# Patient Record
Sex: MALE | Race: WHITE | Employment: FULL TIME | URBAN - METROPOLITAN AREA
[De-identification: names, ages, dates, MRNs, and addresses within clinical notes are randomized per-mention and may not be internally consistent; named-entity substitution may affect disease eponyms.]

---

## 2022-01-30 ENCOUNTER — HOSPITAL ENCOUNTER (EMERGENCY)
Age: 22
Discharge: HOME | End: 2022-01-30
Attending: EMERGENCY MEDICINE

## 2022-01-30 VITALS
HEART RATE: 95 BPM | BODY MASS INDEX: 42.66 KG/M2 | SYSTOLIC BLOOD PRESSURE: 175 MMHG | DIASTOLIC BLOOD PRESSURE: 89 MMHG | HEIGHT: 72 IN | RESPIRATION RATE: 16 BRPM | OXYGEN SATURATION: 100 % | WEIGHT: 315 LBS | TEMPERATURE: 97.3 F

## 2022-01-30 DIAGNOSIS — L05.01 PILONIDAL ABSCESS: Primary | ICD-10-CM

## 2022-01-30 PROCEDURE — 99283 EMERGENCY DEPT VISIT LOW MDM: CPT

## 2022-01-30 PROCEDURE — 6370000000 HC RX 637 (ALT 250 FOR IP): Performed by: EMERGENCY MEDICINE

## 2022-01-30 RX ORDER — ACETAMINOPHEN 500 MG
1000 TABLET ORAL ONCE
Status: COMPLETED | OUTPATIENT
Start: 2022-01-30 | End: 2022-01-30

## 2022-01-30 RX ORDER — DOXYCYCLINE 100 MG/1
100 CAPSULE ORAL ONCE
Status: COMPLETED | OUTPATIENT
Start: 2022-01-30 | End: 2022-01-30

## 2022-01-30 RX ORDER — DOXYCYCLINE 100 MG/1
100 TABLET ORAL 2 TIMES DAILY
Qty: 20 TABLET | Refills: 0 | Status: SHIPPED | OUTPATIENT
Start: 2022-01-30 | End: 2022-02-09

## 2022-01-30 RX ORDER — NAPROXEN 375 MG/1
375 TABLET ORAL 2 TIMES DAILY PRN
Qty: 20 TABLET | Refills: 0 | Status: SHIPPED | OUTPATIENT
Start: 2022-01-30 | End: 2022-02-25

## 2022-01-30 RX ORDER — NAPROXEN 500 MG/1
500 TABLET ORAL ONCE
Status: COMPLETED | OUTPATIENT
Start: 2022-01-30 | End: 2022-01-30

## 2022-01-30 RX ADMIN — NAPROXEN 500 MG: 500 TABLET ORAL at 17:03

## 2022-01-30 RX ADMIN — ACETAMINOPHEN 1000 MG: 500 TABLET ORAL at 17:03

## 2022-01-30 RX ADMIN — DOXYCYCLINE 100 MG: 100 CAPSULE ORAL at 17:03

## 2022-01-30 ASSESSMENT — PAIN SCALES - GENERAL: PAINLEVEL_OUTOF10: 3

## 2022-01-30 ASSESSMENT — ENCOUNTER SYMPTOMS
NAUSEA: 0
BACK PAIN: 0
VOMITING: 0

## 2022-01-30 NOTE — ED PROVIDER NOTES
[facility]  Emergency Department Encounter     Pt Name: Pedro Montes  MRN: [id number]  [place] 2000  Date of evaluation: 1/30/22  PCP:  No primary care provider on file. CHIEF COMPLAINT       Chief Complaint   Patient presents with    Abscess     coccyx area with drainage       HISTORY OF PRESENT ILLNESS  (Location/Symptom, Timing/Onset, Context/Setting, Quality, Duration, Modifying Factors, Severity.)    Pedro Montes is a 19 y.o. male who presents with abscess to tailbone area that has been going on for a couple of days. Drainage has been persistent and foul smelling. Has happened to him before in the past but spontaneously resolved on its own. Has never been diagnosed with pilonidal cyst before. No fevers chills sweats or body aches. No personal or family history of crohns disease or UC. No scrotal or penile involvement. PAST MEDICAL / SURGICAL / SOCIAL / FAMILY HISTORY    has a past medical history of Obesity. has no past surgical history on file. Social History     Socioeconomic History    Marital status: Single     Spouse name: Not on file    Number of children: Not on file    Years of education: Not on file    Highest education level: Not on file   Occupational History    Not on file   Tobacco Use    Smoking status: Never Smoker    Smokeless tobacco: Never Used   Substance and Sexual Activity    Alcohol use: Not on file    Drug use: No    Sexual activity: Not on file   Other Topics Concern    Not on file   Social History Narrative    Not on file     Social Determinants of Health     Financial Resource Strain:     Difficulty of Paying Living Expenses: Not on file   Food Insecurity:     Worried About Running Out of Food in the Last Year: Not on file    Kirk of Food in the Last Year: Not on file   Transportation Needs:     Lack of Transportation (Medical): Not on file    Lack of Transportation (Non-Medical):  Not on file Physical Activity:     Days of Exercise per Week: Not on file    Minutes of Exercise per Session: Not on file   Stress:     Feeling of Stress : Not on file   Social Connections:     Frequency of Communication with Friends and Family: Not on file    Frequency of Social Gatherings with Friends and Family: Not on file    Attends Druze Services: Not on file    Active Member of [address] [place] [address] or Organizations: Not on file    Attends Club or Organization Meetings: Not on file    Marital Status: Not on file   Intimate Partner Violence:     Fear of Current or Ex-Partner: Not on file    Emotionally Abused: Not on file    Physically Abused: Not on file    Sexually Abused: Not on file   Housing Stability:     Unable to Pay for Housing in the Last Year: Not on file    Number of [place] in the Last Year: Not on file    Unstable Housing in the Last Year: Not on file       Family History   Problem Relation Age of Onset     () Unknown        Allergies:    Patient has no known allergies. Home Medications:  Prior to Admission medications    Medication Sig Start Date End Date Taking? Authorizing Provider   doxycycline monohydrate (ADOXA) 100 MG tablet Take 1 tablet by mouth 2 times daily for 10 days 1/30/22 2/9/22 Yes Mary Angel DO   naproxen (NAPROSYN) 375 MG tablet Take 1 tablet by mouth 2 times daily as needed for Pain 1/30/22  Yes Mary Angel DO   clindamycin (CLINDAGEL) 1 % gel APPLY TOPICALLY 2 TIMES DAILY. 5/8/16   Mone Officer, MD   doxycycline (VIBRAMYCIN) 100 MG capsule Take 1 capsule by mouth 2 times daily 1/26/16 1/30/22  Mone Officer, MD   clotrimazole (LOTRIMIN) 1 % cream Apply topically 2 times daily. 1/19/16   MIRIAM Miguel - NP       REVIEW OF SYSTEMS    (2-9 systems for level 4, 10 or more for level 5)    Review of Systems   Constitutional: Negative for chills, diaphoresis and fever. Gastrointestinal: Negative for nausea and vomiting.    Endocrine: Negative for polyuria. Genitourinary: Negative for decreased urine volume, difficulty urinating, dysuria, enuresis, flank pain, frequency, genital sores, hematuria, penile discharge, penile pain, penile swelling, scrotal swelling, testicular pain and urgency. Musculoskeletal: Positive for myalgias. Negative for back pain. Skin: Positive for wound. Allergic/Immunologic: Negative for immunocompromised state. PHYSICAL EXAM   (up to 7 for level 4, 8 or more for level 5)    VITALS:   Vitals:    01/30/22 1629   BP: (!) 175/89   Pulse: 95   Resp: 16   Temp: 97.3 °F (36.3 °C)   SpO2: 100%   Weight: (!) 410 lb (186 kg)   Height: 6' (1.829 m)       Physical Exam  Vitals and nursing note reviewed. Exam conducted with a chaperone present. Constitutional:       General: He is not in acute distress. Appearance: He is well-developed. He is obese. He is not diaphoretic. HENT:      Head: Normocephalic and atraumatic. Eyes:      Conjunctiva/sclera: Conjunctivae normal.   Cardiovascular:      Rate and Rhythm: Normal rate and regular rhythm. Heart sounds: Normal heart sounds. Pulmonary:      Effort: Pulmonary effort is normal. No respiratory distress. Breath sounds: Normal breath sounds. Abdominal:      General: There is no distension. Palpations: Abdomen is soft. Tenderness: There is no abdominal tenderness. There is no guarding or rebound. Genitourinary:     Rectum: Normal.      Comments: Pilonidal cyst with two tracks, on superior and one mid-distal cleft with active foul smelling purulent drainage. No perianal or perirectal involvement. No involvement of the scrotum. No crepitus. Musculoskeletal:         General: Normal range of motion. Cervical back: Normal range of motion. Skin:     General: Skin is warm and dry. Findings: Erythema present. Neurological:      General: No focal deficit present. Mental Status: He is alert.    Psychiatric:         Behavior: Behavior normal. MD  [address] [facility] [address]  [phone]          DISCHARGE MEDICATIONS:  New Prescriptions    DOXYCYCLINE MONOHYDRATE (ADOXA) 100 MG TABLET    Take 1 tablet by mouth 2 times daily for 10 days    NAPROXEN (NAPROSYN) 375 MG TABLET    Take 1 tablet by mouth 2 times daily as needed for Pain       Mary Irwin, DO  Emergency Medicine Physician    (Please note that portions of this note were completed with a voice recognition program.  Efforts were made to edit the dictations but occasionally words are mis-transcribed.)        Sharif Bower 1721, DO  01/30/22 7460

## 2022-01-30 NOTE — ED NOTES
Pt c/o abscess to buttocks with drainage for several days. Pt states he has had abscesses in the past but they cleared up on their own.  Pt denies fever/chills, denies N&V     Mandi Hill, [facility]  01/30/22 6911

## 2022-02-09 ENCOUNTER — HOSPITAL ENCOUNTER (OUTPATIENT)
Dept: SURGERY | Age: 22
Discharge: HOME | End: 2022-02-09

## 2022-02-09 VITALS
WEIGHT: 315 LBS | HEART RATE: 90 BPM | HEIGHT: 72 IN | SYSTOLIC BLOOD PRESSURE: 137 MMHG | DIASTOLIC BLOOD PRESSURE: 94 MMHG | BODY MASS INDEX: 42.66 KG/M2

## 2022-02-09 PROCEDURE — 99202 OFFICE O/P NEW SF 15 MIN: CPT

## 2022-02-09 PROCEDURE — 99203 OFFICE O/P NEW LOW 30 MIN: CPT | Performed by: SURGERY

## 2022-02-09 NOTE — CONSULTS
[facility]  Consult Note            NAME:  Rivera Kay  MRN: [id number]   YOB: 2000   Date: 2022   Age: 19 y.o. Gender: male     Body mass index is 55.33 kg/m². Chief Complaint: Recurrent  cleft infections    History of Present Illness: This is a 57-year-old male with morbid obesity who has had history of  cleft infections. Patient states that initially he had this episode in 2017. After it spontaneously drained, he felt that the symptoms had resolved. However he has been having recurrent episodes of drainage from the area starting this year. Patient feels that the infection is occurring in the same location. He has had no history of trauma to the area. He denies any fevers or chills. He comes to us for definitive treatment. Current Outpatient Medications on File Prior to Encounter   Medication Sig Dispense Refill    doxycycline monohydrate (ADOXA) 100 MG tablet Take 1 tablet by mouth 2 times daily for 10 days 20 tablet 0    naproxen (NAPROSYN) 375 MG tablet Take 1 tablet by mouth 2 times daily as needed for Pain 20 tablet 0    clindamycin (CLINDAGEL) 1 % gel APPLY TOPICALLY 2 TIMES DAILY. 1 Tube 5    [DISCONTINUED] doxycycline (VIBRAMYCIN) 100 MG capsule Take 1 capsule by mouth 2 times daily 60 capsule 11    clotrimazole (LOTRIMIN) 1 % cream Apply topically 2 times daily. 1 Tube 0     No current facility-administered medications on file prior to encounter. No Known Allergies    Past Medical History:   Diagnosis Date    Obesity        History reviewed. No pertinent surgical history.     Family History   Problem Relation Age of Onset     () Unknown         Social History     Socioeconomic History    Marital status: Single     Spouse name: None    Number of children: None    Years of education: None    Highest education level: None   Occupational History    None   Tobacco Use    Smoking status: Never Smoker    Smokeless tobacco: Never Used   Substance and Sexual Activity    Alcohol use: None    Drug use: No    Sexual activity: None   Other Topics Concern    None   Social History Narrative    None     Social Determinants of Health     Financial Resource Strain:     Difficulty of Paying Living Expenses: Not on file   Food Insecurity:     Worried About Running Out of Food in the Last Year: Not on file    Kirk of Food in the Last Year: Not on file   Transportation Needs:     Lack of Transportation (Medical): Not on file    Lack of Transportation (Non-Medical): Not on file   Physical Activity:     Days of Exercise per Week: Not on file    Minutes of Exercise per Session: Not on file   Stress:     Feeling of Stress : Not on file   Social Connections:     Frequency of Communication with Friends and Family: Not on file    Frequency of Social Gatherings with Friends and Family: Not on file    Attends Catholic Services: Not on file    Active Member of [address] [place] [address] or Organizations: Not on file    Attends Club or Organization Meetings: Not on file    Marital Status: Not on file   Intimate Partner Violence:     Fear of Current or Ex-Partner: Not on file    Emotionally Abused: Not on file    Physically Abused: Not on file    Sexually Abused: Not on file   Housing Stability:     Unable to Pay for Housing in the Last Year: Not on file    Number of [place] in the Last Year: Not on file    Unstable Housing in the Last Year: Not on file       Review of Systems - Negative except depression    Vitals:    02/09/22 1557   BP: (!) 137/94   Pulse: 90        No intake/output data recorded.     General Appearance: alert and oriented to person, place and time, well developed and well- nourished, in no acute distress patient is morbidly obese  Skin: warm and dry, no rash or erythema  Head: normocephalic and atraumatic  Eyes: pupils equal, round, and reactive to light, extraocular eye movements intact, conjunctivae normal  ENT: tympanic membrane, external ear and ear canal normal bilaterally, nose without deformity, nasal mucosa and turbinates normal without polyps  Neck: supple and non-tender without mass, no thyromegaly or thyroid nodules, no cervical lymphadenopathy  Pulmonary/Chest: clear to auscultation bilaterally- no wheezes, rales or rhonchi, normal air movement, no respiratory distress  Cardiovascular: normal rate, regular rhythm, normal S1 and S2, no murmurs, rubs, clicks, or gallops, distal pulses intact, no carotid bruits  Abdomen: soft, non-tender, non-distended, normal bowel sounds, no masses or organomegaly. Examination in the prone jackknife position shows an area of induration in the flor cleft region with small dimpling in the midline suggesting possibly pilonidal cyst related infection. There is no underlying abscess noted. Extremities: no cyanosis, clubbing or edema  Musculoskeletal: normal range of motion, no joint swelling, deformity or tenderness  Neurologic: reflexes normal and symmetric, no cranial nerve deficit, gait, coordination and speech normal    Hemoglobin   Date Value Ref Range Status   2016 14.7 13.5 - 17.5 g/dL Final     Hematocrit   Date Value Ref Range Status   2016 44.0 41 - 53 % Final     WBC   Date Value Ref Range Status   2016 8.7 4.5 - 13.5 k/uL Final     Sodium   Date Value Ref Range Status   2016 139 135 - 144 mmol/L Final     Potassium   Date Value Ref Range Status   2016 4.0 3.6 - 4.9 mmol/L Final     Chloride   Date Value Ref Range Status   2016 100 98 - 107 mmol/L Final     CO2   Date Value Ref Range Status   2016 22 20 - 31 mmol/L Final     BUN   Date Value Ref Range Status   2016 15 5 - 18 mg/dL Final     Glucose   Date Value Ref Range Status   2016 89 60 - 100 mg/dL Final        No results found. Assessment: Pilonidal cyst    Plan: Patient with recurrent episodes of  cleft infections.   The findings are most likely suggestive of pilonidal cyst disease. I recommend patient to consider excision with cystectomy. Procedure and the risks were discussed. Risks for infection, bleeding, recurrence, slow healing of the wound etc. were discussed. Strong recommendation was made for the patient to make effort to lose weight. We will schedule for the surgery in the near future.     The total time spent on review of patient's record, physical examination, treatment plan and review of systems included: 30    Electronically signed by Daniel Brice MD on 2/9/2022 at 5:11 PM

## 2022-02-25 ENCOUNTER — HOSPITAL ENCOUNTER (OUTPATIENT)
Dept: PREADMISSION TESTING | Age: 22
Discharge: HOME | End: 2022-03-01

## 2022-02-25 VITALS
RESPIRATION RATE: 20 BRPM | HEIGHT: 72 IN | TEMPERATURE: 98.6 F | OXYGEN SATURATION: 100 % | DIASTOLIC BLOOD PRESSURE: 87 MMHG | BODY MASS INDEX: 42.66 KG/M2 | WEIGHT: 315 LBS | SYSTOLIC BLOOD PRESSURE: 143 MMHG | HEART RATE: 69 BPM

## 2022-02-25 LAB
ANION GAP SERPL CALCULATED.3IONS-SCNC: 10 MMOL/L (ref 9–17)
BUN BLDV-MCNC: 12 MG/DL (ref 6–20)
BUN/CREAT BLD: 15 (ref 9–20)
CALCIUM SERPL-MCNC: 9.4 MG/DL (ref 8.6–10.4)
CHLORIDE BLD-SCNC: 102 MMOL/L (ref 98–107)
CO2: 26 MMOL/L (ref 20–31)
CREAT SERPL-MCNC: 0.78 MG/DL (ref 0.7–1.2)
GFR AFRICAN AMERICAN: >60 ML/MIN
GFR NON-AFRICAN AMERICAN: >60 ML/MIN
GFR SERPL CREATININE-BSD FRML MDRD: NORMAL ML/MIN/{1.73_M2}
GLUCOSE BLD-MCNC: 94 MG/DL (ref 70–99)
HCT VFR BLD CALC: 42.1 % (ref 40.7–50.3)
HEMOGLOBIN: 13.5 G/DL (ref 13–17)
MCH RBC QN AUTO: 27 PG (ref 25.2–33.5)
MCHC RBC AUTO-ENTMCNC: 32.1 G/DL (ref 28.4–34.8)
MCV RBC AUTO: 84.2 FL (ref 82.6–102.9)
NRBC AUTOMATED: 0 PER 100 WBC
PDW BLD-RTO: 14.9 % (ref 11.8–14.4)
PLATELET # BLD: 302 K/UL (ref 138–453)
PMV BLD AUTO: 9.3 FL (ref 8.1–13.5)
POTASSIUM SERPL-SCNC: 4.9 MMOL/L (ref 3.7–5.3)
RBC # BLD: 5 M/UL (ref 4.21–5.77)
SODIUM BLD-SCNC: 138 MMOL/L (ref 135–144)
WBC # BLD: 8.7 K/UL (ref 3.5–11.3)

## 2022-02-25 PROCEDURE — 80048 BASIC METABOLIC PNL TOTAL CA: CPT

## 2022-02-25 PROCEDURE — 36415 COLL VENOUS BLD VENIPUNCTURE: CPT

## 2022-02-25 PROCEDURE — 93005 ELECTROCARDIOGRAM TRACING: CPT | Performed by: ANESTHESIOLOGY

## 2022-02-25 PROCEDURE — 85027 COMPLETE CBC AUTOMATED: CPT

## 2022-02-25 RX ORDER — ACETAMINOPHEN 325 MG/1
1000 TABLET ORAL EVERY 6 HOURS PRN
COMMUNITY

## 2022-02-25 NOTE — PRE-PROCEDURE INSTRUCTIONS
ARRIVE  [facility] Thursday March 10th  Arrive at 12:00  Any questions call [phone]    Once you enter the hospital lobby, take the elevators to the second floor. Check-In is at the surgery registration desk. Continue to take your home medications as you normally do up to and including the night before surgery with the exception of any blood thinning medications. Please stop any blood thinning medications as directed by your surgeon or prescribing physician. Failure to stop certain medications may interfere with your scheduled surgery. These may include:  Aspirin, Warfarin (Coumadin), Clopidogrel (Plavix), Ibuprofen (Motrin, Advil), Naproxen (Aleve), Meloxicam (Mobic), Celecoxib (Celebrex), Eliquis, Pradaxa, Xarelto, Effient, Fish Oil, Herbal supplements. none    Please take the following medication(s) the day of surgery with a small sip of water:  none    PREPARING FOR YOUR SURGERY:     Before surgery, you can play an important role in your own health. Because skin is not sterile, we need to be sure that your skin is as free of germs as possible before surgery by carefully washing before surgery. Preparing or prepping skin before surgery can reduce the risk of a surgical site infection.   Do not shave the area of your body where your surgery will be performed unless you received specific permission from your physician. Preoperative Bathing Instructions   Bathe or shower the day of surgery.  Wear clean clothing after bathing.  Shampoo hair before surgery   Keep nails clean    Do not apply alcohol-based hair or skin products,    Do not apply lotion, emollients, cosmetics, perfumes or deodorant the day of surgery.  Do not shave the area of your body where your surgery will be performed unless you receive specific permission from your surgeon.        Patient Instructions:    [place] If you are having any type of anesthesia you are to have nothing to eat or drink after midnight the night before your surgery. This includes gum, mints, water or smoking or chewing tobacco.  The only exception to this is a small sip of water to take with any morning dose of heart, blood pressure, or seizure medications. No alcoholic beverages for 24 hours prior to surgery.  Brush your teeth but do not swallow water.  Do not wear any jewelry or body piercings day of surgery. Also, NO lotion, perfume or deodorant to be used the day of surgery. No nail polish on the operative extremity (arm/leg surgeries)     Do not bring any valuables, such as jewelry, cash or credit cards. If you are staying overnight with us, please bring a SMALL bag of personal items.  Please wear loose, comfortable clothing. If you are potentially going to have a cast or brace bring clothing that will fit over them.  In case of illness - If you have cold or flu like symptoms (high fever, runny nose, sore throat, cough, etc.) rash, nausea, vomiting, loose stools, and/or recent contact with someone who has a contagious disease (chicken pox, measles, etc.) Please call your doctor before coming to the hospital.     Day of Surgery/Procedure:    As a patient at [place] [address] you can expect quality medical and nursing care that is centered on your individual needs. Our goal is to make your surgical experience as comfortable as possible    . Transportation After Your Surgery/Procedure: You will need a friend or family member to drive you home after your procedure. Your  must be 25years of age or older and able to sign off on your discharge instructions. A taxi cab or any other form of public transportation is not acceptable. Your friend or family member must stay at the hospital throughout your procedure.     Someone must remain with you for the first 24 hours after your surgery if you receive anesthesia or medication. If you do not have someone to stay with you, your procedure may be cancelled.       If you have any other questions regarding your procedure or the day of surgery, please call [phone]      _________________________  ____________________________  Signature (Patient)              Signature (Provider) & date

## 2022-02-26 LAB
EKG ATRIAL RATE: 68 BPM
EKG P AXIS: 29 DEGREES
EKG P-R INTERVAL: 160 MS
EKG Q-T INTERVAL: 380 MS
EKG QRS DURATION: 94 MS
EKG QTC CALCULATION (BAZETT): 404 MS
EKG R AXIS: 32 DEGREES
EKG T AXIS: 28 DEGREES
EKG VENTRICULAR RATE: 68 BPM

## 2022-02-26 PROCEDURE — 93010 ELECTROCARDIOGRAM REPORT: CPT | Performed by: INTERNAL MEDICINE

## 2022-03-09 ENCOUNTER — ANESTHESIA EVENT (OUTPATIENT)
Dept: OPERATING ROOM | Age: 22
End: 2022-03-09

## 2022-03-10 ENCOUNTER — HOSPITAL ENCOUNTER (OUTPATIENT)
Age: 22
Setting detail: OUTPATIENT SURGERY
Discharge: HOME | End: 2022-03-10
Attending: SURGERY | Admitting: SURGERY

## 2022-03-10 ENCOUNTER — ANESTHESIA (OUTPATIENT)
Dept: OPERATING ROOM | Age: 22
End: 2022-03-10

## 2022-03-10 VITALS
RESPIRATION RATE: 13 BRPM | TEMPERATURE: 97.5 F | OXYGEN SATURATION: 95 % | HEIGHT: 72 IN | SYSTOLIC BLOOD PRESSURE: 151 MMHG | DIASTOLIC BLOOD PRESSURE: 70 MMHG | HEART RATE: 80 BPM | BODY MASS INDEX: 42.66 KG/M2 | WEIGHT: 315 LBS

## 2022-03-10 VITALS — DIASTOLIC BLOOD PRESSURE: 81 MMHG | SYSTOLIC BLOOD PRESSURE: 187 MMHG | TEMPERATURE: 97 F | OXYGEN SATURATION: 98 %

## 2022-03-10 DIAGNOSIS — G89.18 ACUTE POST-OPERATIVE PAIN: Primary | ICD-10-CM

## 2022-03-10 PROCEDURE — 3600000002 HC SURGERY LEVEL 2 BASE: Performed by: SURGERY

## 2022-03-10 PROCEDURE — 2709999900 HC NON-CHARGEABLE SUPPLY: Performed by: SURGERY

## 2022-03-10 PROCEDURE — 2500000003 HC RX 250 WO HCPCS: Performed by: SURGERY

## 2022-03-10 PROCEDURE — 2580000003 HC RX 258: Performed by: SURGERY

## 2022-03-10 PROCEDURE — 7100000011 HC PHASE II RECOVERY - ADDTL 15 MIN: Performed by: SURGERY

## 2022-03-10 PROCEDURE — 7100000010 HC PHASE II RECOVERY - FIRST 15 MIN: Performed by: SURGERY

## 2022-03-10 PROCEDURE — 6360000002 HC RX W HCPCS: Performed by: SURGERY

## 2022-03-10 PROCEDURE — 3600000012 HC SURGERY LEVEL 2 ADDTL 15MIN: Performed by: SURGERY

## 2022-03-10 PROCEDURE — 7100000001 HC PACU RECOVERY - ADDTL 15 MIN: Performed by: SURGERY

## 2022-03-10 PROCEDURE — 88304 TISSUE EXAM BY PATHOLOGIST: CPT

## 2022-03-10 PROCEDURE — 3700000001 HC ADD 15 MINUTES (ANESTHESIA): Performed by: SURGERY

## 2022-03-10 PROCEDURE — 6360000002 HC RX W HCPCS: Performed by: NURSE ANESTHETIST, CERTIFIED REGISTERED

## 2022-03-10 PROCEDURE — 7100000000 HC PACU RECOVERY - FIRST 15 MIN: Performed by: SURGERY

## 2022-03-10 PROCEDURE — 11772 EXC PILONIDAL CYST COMP: CPT | Performed by: SURGERY

## 2022-03-10 PROCEDURE — 2500000003 HC RX 250 WO HCPCS: Performed by: NURSE ANESTHETIST, CERTIFIED REGISTERED

## 2022-03-10 PROCEDURE — 3700000000 HC ANESTHESIA ATTENDED CARE: Performed by: SURGERY

## 2022-03-10 PROCEDURE — 6370000000 HC RX 637 (ALT 250 FOR IP)

## 2022-03-10 PROCEDURE — 2580000003 HC RX 258: Performed by: ANESTHESIOLOGY

## 2022-03-10 RX ORDER — ONDANSETRON 2 MG/ML
4 INJECTION INTRAMUSCULAR; INTRAVENOUS
Status: DISCONTINUED | OUTPATIENT
Start: 2022-03-10 | End: 2022-03-10 | Stop reason: HOSPADM

## 2022-03-10 RX ORDER — KETOROLAC TROMETHAMINE 30 MG/ML
30 INJECTION, SOLUTION INTRAMUSCULAR; INTRAVENOUS ONCE
Status: DISCONTINUED | OUTPATIENT
Start: 2022-03-10 | End: 2022-03-10

## 2022-03-10 RX ORDER — OXYCODONE HYDROCHLORIDE 5 MG/1
5 TABLET ORAL EVERY 6 HOURS PRN
Qty: 20 TABLET | Refills: 0 | Status: SHIPPED | OUTPATIENT
Start: 2022-03-10 | End: 2022-03-15

## 2022-03-10 RX ORDER — SODIUM CHLORIDE 9 MG/ML
25 INJECTION, SOLUTION INTRAVENOUS PRN
Status: DISCONTINUED | OUTPATIENT
Start: 2022-03-10 | End: 2022-03-10 | Stop reason: HOSPADM

## 2022-03-10 RX ORDER — ACETAMINOPHEN 325 MG/1
TABLET ORAL
Status: DISCONTINUED
Start: 2022-03-10 | End: 2022-03-10 | Stop reason: HOSPADM

## 2022-03-10 RX ORDER — LIDOCAINE HYDROCHLORIDE 10 MG/ML
1 INJECTION, SOLUTION EPIDURAL; INFILTRATION; INTRACAUDAL; PERINEURAL
Status: DISCONTINUED | OUTPATIENT
Start: 2022-03-11 | End: 2022-03-10 | Stop reason: HOSPADM

## 2022-03-10 RX ORDER — ACETAMINOPHEN 325 MG/1
650 TABLET ORAL ONCE
Status: COMPLETED | OUTPATIENT
Start: 2022-03-10 | End: 2022-03-10

## 2022-03-10 RX ORDER — FENTANYL CITRATE 50 UG/ML
25 INJECTION, SOLUTION INTRAMUSCULAR; INTRAVENOUS EVERY 5 MIN PRN
Status: DISCONTINUED | OUTPATIENT
Start: 2022-03-10 | End: 2022-03-10 | Stop reason: HOSPADM

## 2022-03-10 RX ORDER — SODIUM CHLORIDE, SODIUM LACTATE, POTASSIUM CHLORIDE, CALCIUM CHLORIDE 600; 310; 30; 20 MG/100ML; MG/100ML; MG/100ML; MG/100ML
INJECTION, SOLUTION INTRAVENOUS CONTINUOUS
Status: DISCONTINUED | OUTPATIENT
Start: 2022-03-11 | End: 2022-03-10 | Stop reason: HOSPADM

## 2022-03-10 RX ORDER — IBUPROFEN 200 MG
400 TABLET ORAL EVERY 6 HOURS
Qty: 120 TABLET | Refills: 3 | Status: SHIPPED | OUTPATIENT
Start: 2022-03-10

## 2022-03-10 RX ORDER — ROCURONIUM BROMIDE 10 MG/ML
INJECTION, SOLUTION INTRAVENOUS PRN
Status: DISCONTINUED | OUTPATIENT
Start: 2022-03-10 | End: 2022-03-10 | Stop reason: SDUPTHER

## 2022-03-10 RX ORDER — FENTANYL CITRATE 50 UG/ML
INJECTION, SOLUTION INTRAMUSCULAR; INTRAVENOUS PRN
Status: DISCONTINUED | OUTPATIENT
Start: 2022-03-10 | End: 2022-03-10 | Stop reason: SDUPTHER

## 2022-03-10 RX ORDER — SENNA AND DOCUSATE SODIUM 50; 8.6 MG/1; MG/1
1 TABLET, FILM COATED ORAL DAILY
Qty: 60 TABLET | Refills: 0 | Status: SHIPPED | OUTPATIENT
Start: 2022-03-10

## 2022-03-10 RX ORDER — BUPIVACAINE HYDROCHLORIDE AND EPINEPHRINE 5; 5 MG/ML; UG/ML
INJECTION, SOLUTION EPIDURAL; INTRACAUDAL; PERINEURAL PRN
Status: DISCONTINUED | OUTPATIENT
Start: 2022-03-10 | End: 2022-03-10 | Stop reason: ALTCHOICE

## 2022-03-10 RX ORDER — PROPOFOL 10 MG/ML
INJECTION, EMULSION INTRAVENOUS PRN
Status: DISCONTINUED | OUTPATIENT
Start: 2022-03-10 | End: 2022-03-10 | Stop reason: SDUPTHER

## 2022-03-10 RX ORDER — MIDAZOLAM HYDROCHLORIDE 1 MG/ML
INJECTION INTRAMUSCULAR; INTRAVENOUS PRN
Status: DISCONTINUED | OUTPATIENT
Start: 2022-03-10 | End: 2022-03-10 | Stop reason: SDUPTHER

## 2022-03-10 RX ORDER — SODIUM CHLORIDE 0.9 % (FLUSH) 0.9 %
5-40 SYRINGE (ML) INJECTION EVERY 12 HOURS SCHEDULED
Status: DISCONTINUED | OUTPATIENT
Start: 2022-03-10 | End: 2022-03-10 | Stop reason: HOSPADM

## 2022-03-10 RX ORDER — HYDROMORPHONE HYDROCHLORIDE 1 MG/ML
0.25 INJECTION, SOLUTION INTRAMUSCULAR; INTRAVENOUS; SUBCUTANEOUS EVERY 5 MIN PRN
Status: DISCONTINUED | OUTPATIENT
Start: 2022-03-10 | End: 2022-03-10 | Stop reason: HOSPADM

## 2022-03-10 RX ORDER — LIDOCAINE HYDROCHLORIDE 20 MG/ML
INJECTION, SOLUTION EPIDURAL; INFILTRATION; INTRACAUDAL; PERINEURAL PRN
Status: DISCONTINUED | OUTPATIENT
Start: 2022-03-10 | End: 2022-03-10 | Stop reason: SDUPTHER

## 2022-03-10 RX ORDER — SODIUM CHLORIDE 0.9 % (FLUSH) 0.9 %
5-40 SYRINGE (ML) INJECTION PRN
Status: DISCONTINUED | OUTPATIENT
Start: 2022-03-10 | End: 2022-03-10 | Stop reason: HOSPADM

## 2022-03-10 RX ORDER — DEXAMETHASONE SODIUM PHOSPHATE 10 MG/ML
INJECTION INTRAMUSCULAR; INTRAVENOUS PRN
Status: DISCONTINUED | OUTPATIENT
Start: 2022-03-10 | End: 2022-03-10 | Stop reason: SDUPTHER

## 2022-03-10 RX ORDER — ACETAMINOPHEN 325 MG/1
TABLET ORAL
Status: COMPLETED
Start: 2022-03-10 | End: 2022-03-10

## 2022-03-10 RX ORDER — SODIUM CHLORIDE 0.9 % (FLUSH) 0.9 %
10 SYRINGE (ML) INJECTION PRN
Status: DISCONTINUED | OUTPATIENT
Start: 2022-03-10 | End: 2022-03-10 | Stop reason: HOSPADM

## 2022-03-10 RX ORDER — ONDANSETRON 2 MG/ML
INJECTION INTRAMUSCULAR; INTRAVENOUS PRN
Status: DISCONTINUED | OUTPATIENT
Start: 2022-03-10 | End: 2022-03-10 | Stop reason: SDUPTHER

## 2022-03-10 RX ORDER — SODIUM CHLORIDE 9 MG/ML
INJECTION, SOLUTION INTRAVENOUS CONTINUOUS
Status: DISCONTINUED | OUTPATIENT
Start: 2022-03-11 | End: 2022-03-10 | Stop reason: HOSPADM

## 2022-03-10 RX ORDER — SODIUM CHLORIDE 0.9 % (FLUSH) 0.9 %
10 SYRINGE (ML) INJECTION EVERY 12 HOURS SCHEDULED
Status: DISCONTINUED | OUTPATIENT
Start: 2022-03-10 | End: 2022-03-10 | Stop reason: HOSPADM

## 2022-03-10 RX ORDER — ONDANSETRON 4 MG/1
4 TABLET, FILM COATED ORAL EVERY 8 HOURS PRN
Qty: 20 TABLET | Refills: 0 | Status: SHIPPED | OUTPATIENT
Start: 2022-03-10

## 2022-03-10 RX ORDER — KETOROLAC TROMETHAMINE 30 MG/ML
INJECTION, SOLUTION INTRAMUSCULAR; INTRAVENOUS PRN
Status: DISCONTINUED | OUTPATIENT
Start: 2022-03-10 | End: 2022-03-10 | Stop reason: SDUPTHER

## 2022-03-10 RX ADMIN — SUGAMMADEX 500 MG: 100 INJECTION, SOLUTION INTRAVENOUS at 12:44

## 2022-03-10 RX ADMIN — KETOROLAC TROMETHAMINE 30 MG: 30 INJECTION, SOLUTION INTRAMUSCULAR; INTRAVENOUS at 12:01

## 2022-03-10 RX ADMIN — ACETAMINOPHEN 650 MG: 325 TABLET ORAL at 13:58

## 2022-03-10 RX ADMIN — PROPOFOL 50 MG: 10 INJECTION, EMULSION INTRAVENOUS at 12:42

## 2022-03-10 RX ADMIN — CEFAZOLIN SODIUM 3000 MG: 10 INJECTION, POWDER, FOR SOLUTION INTRAVENOUS at 11:28

## 2022-03-10 RX ADMIN — LIDOCAINE HYDROCHLORIDE 100 MG: 20 INJECTION, SOLUTION EPIDURAL; INFILTRATION; INTRACAUDAL; PERINEURAL at 11:20

## 2022-03-10 RX ADMIN — Medication 100 MCG: at 11:20

## 2022-03-10 RX ADMIN — PROPOFOL 350 MG: 10 INJECTION, EMULSION INTRAVENOUS at 11:20

## 2022-03-10 RX ADMIN — SODIUM CHLORIDE, POTASSIUM CHLORIDE, SODIUM LACTATE AND CALCIUM CHLORIDE: 600; 310; 30; 20 INJECTION, SOLUTION INTRAVENOUS at 11:39

## 2022-03-10 RX ADMIN — ONDANSETRON 4 MG: 2 INJECTION INTRAMUSCULAR; INTRAVENOUS at 11:39

## 2022-03-10 RX ADMIN — LIDOCAINE HYDROCHLORIDE 100 MG: 20 INJECTION, SOLUTION EPIDURAL; INFILTRATION; INTRACAUDAL; PERINEURAL at 12:49

## 2022-03-10 RX ADMIN — ROCURONIUM BROMIDE 50 MG: 10 INJECTION, SOLUTION INTRAVENOUS at 11:20

## 2022-03-10 RX ADMIN — MIDAZOLAM 2 MG: 1 INJECTION INTRAMUSCULAR; INTRAVENOUS at 11:16

## 2022-03-10 RX ADMIN — DEXAMETHASONE SODIUM PHOSPHATE 10 MG: 10 INJECTION INTRAMUSCULAR; INTRAVENOUS at 11:39

## 2022-03-10 ASSESSMENT — PULMONARY FUNCTION TESTS
PIF_VALUE: 29
PIF_VALUE: 30
PIF_VALUE: 30
PIF_VALUE: 22
PIF_VALUE: 28
PIF_VALUE: 30
PIF_VALUE: 29
PIF_VALUE: 30
PIF_VALUE: 29
PIF_VALUE: 29
PIF_VALUE: 30
PIF_VALUE: 29
PIF_VALUE: 30
PIF_VALUE: 29
PIF_VALUE: 2
PIF_VALUE: 21
PIF_VALUE: 29
PIF_VALUE: 28
PIF_VALUE: 30
PIF_VALUE: 1
PIF_VALUE: 28
PIF_VALUE: 14
PIF_VALUE: 30
PIF_VALUE: 28
PIF_VALUE: 29
PIF_VALUE: 28
PIF_VALUE: 30
PIF_VALUE: 30
PIF_VALUE: 29
PIF_VALUE: 28
PIF_VALUE: 28
PIF_VALUE: 30
PIF_VALUE: 4
PIF_VALUE: 2
PIF_VALUE: 29
PIF_VALUE: 30
PIF_VALUE: 29
PIF_VALUE: 29
PIF_VALUE: 28
PIF_VALUE: 1
PIF_VALUE: 17
PIF_VALUE: 21
PIF_VALUE: 1
PIF_VALUE: 30
PIF_VALUE: 24
PIF_VALUE: 30
PIF_VALUE: 28
PIF_VALUE: 17
PIF_VALUE: 30
PIF_VALUE: 29
PIF_VALUE: 37
PIF_VALUE: 36
PIF_VALUE: 29
PIF_VALUE: 29
PIF_VALUE: 30
PIF_VALUE: 28
PIF_VALUE: 29
PIF_VALUE: 4
PIF_VALUE: 6
PIF_VALUE: 28
PIF_VALUE: 30
PIF_VALUE: 33
PIF_VALUE: 28
PIF_VALUE: 30
PIF_VALUE: 30
PIF_VALUE: 28
PIF_VALUE: 30
PIF_VALUE: 28
PIF_VALUE: 29
PIF_VALUE: 1
PIF_VALUE: 29
PIF_VALUE: 33
PIF_VALUE: 29
PIF_VALUE: 6
PIF_VALUE: 28
PIF_VALUE: 29
PIF_VALUE: 8
PIF_VALUE: 1
PIF_VALUE: 35
PIF_VALUE: 30
PIF_VALUE: 20
PIF_VALUE: 28
PIF_VALUE: 24
PIF_VALUE: 24
PIF_VALUE: 29
PIF_VALUE: 30
PIF_VALUE: 28
PIF_VALUE: 1
PIF_VALUE: 29
PIF_VALUE: 29
PIF_VALUE: 30
PIF_VALUE: 29
PIF_VALUE: 30
PIF_VALUE: 30
PIF_VALUE: 29
PIF_VALUE: 21
PIF_VALUE: 30
PIF_VALUE: 29

## 2022-03-10 ASSESSMENT — PAIN - FUNCTIONAL ASSESSMENT: PAIN_FUNCTIONAL_ASSESSMENT: 0-10

## 2022-03-10 ASSESSMENT — LIFESTYLE VARIABLES: SMOKING_STATUS: 0

## 2022-03-10 ASSESSMENT — PAIN SCALES - GENERAL: PAINLEVEL_OUTOF10: 3

## 2022-03-10 NOTE — H&P
History and Physical Update    Pt Name: Annia Cohen  MRN: [id number]  YOB: 2000  Date of evaluation: 3/10/2022      [x] I have reviewed the general surgery consult  note found in Epic by Dr. Fernanda Carreon dated 22 used for an Interval History and Physical note. [x] I have examined Annia Cohen a 19 y.o., male who is scheduled for a pilonidal cystectomy by Dr. Fernanda Carreon due to pilonidal cyst. The patient denies health changes since his appointment with Dr. Fernanda Carreon on 22. Pt denies fever, chills, productive cough, SOB, chest pain, rashes, and history of diabetes. Pt denies taking blood thinning medications in the past 10 days. EK22   Narrative & Impression    Normal sinus rhythm  Inferior infarct , age undetermined  Abnormal ECG  No previous ECGs available     Dr. Cynthia Anderson looked at the pt's EKG dated 22 and assessed the pt in Pre-op. Vital signs: /71   Pulse 91   Temp 97.5 °F (36.4 °C) (Tympanic)   Resp 18   Ht 6' (1.829 m)   Wt (!) 412 lb (186.9 kg)   SpO2 97%   BMI 55.88 kg/m²      Allergies:  Patient has no known allergies. Past Medical History:     Past Medical History:   Diagnosis Date    Obesity         Past Surgical History:     Past Surgical History:   Procedure Laterality Date    EYE SURGERY      eyebrow closure        Social History:     Tobacco:    reports that he quit smoking about 4 weeks ago. He has never used smokeless tobacco.  Alcohol:      reports current alcohol use. Drug Use:  reports previous drug use. Family History:     History reviewed. No pertinent family history. Medications:    Prior to Admission medications    Medication Sig Start Date End Date Taking?  Authorizing Provider   acetaminophen (TYLENOL) 325 MG tablet Take 1,000 mg by mouth every 6 hours as needed for Pain    Historical Provider, MD   doxycycline (VIBRAMYCIN) 100 MG capsule Take 1 capsule by mouth 2 times daily 16  Nabil Garcia MD       Review of Systems: Positive and Negative as described in HPI. CONSTITUTIONAL: Negative for fevers, chills, sweats, fatigue, and weight loss. HEENT: Negative for glasses, hearing changes, rhinorrhea, and throat pain. RESPIRATORY: Negative for shortness of breath, cough, congestion, and wheezing. CARDIOVASCULAR: Negative for chest pain, blood clot, irregular heartbeat, and palpitations. GASTROINTESTINAL: Negative for reflux, nausea, vomiting, diarrhea, constipation, change in bowel habits, and abdominal pain. GENITOURINARY: Negative for difficulty of urination, burning with urination, and frequency. INTEGUMENT: Zohaib cleft infections per the pt's medical record. Negative for rash and easy bruising. HEMATOLOGIC/LYMPHATIC: Negative for swelling/edema. ALLERGIC/IMMUNOLOGIC: Negative for urticaria and itching. ENDOCRINE: Negative for increase in drinking, increase in urination, and heat or cold intolerance. MUSCULOSKELETAL: Negative joint pains, muscle aches, and swelling of joints. NEUROLOGICAL: Occasional headaches. Negative for dizziness, lightheadedness, numbness, and tingling extremities. Pt denies history of seizures and strokes. BEHAVIOR/PSYCH: Negative for depression and anxiety. Physical Exam:     This is a 19 y.o. male who is pleasant, cooperative, alert and oriented x 3, in no acute distress. Morbidly obese. Heart: Regular rate and rhythm without murmur, gallop, or rub. Lungs: Normal respiratory effort, unlabored, and clear to auscultation without wheezes or rales bilaterally. Abdomen: Rotund. Soft, nontender, and active bowel sounds. Pedal pulses: are palpable bilaterally. Labs:  Recent Labs     22  1035   HGB 13.5   HCT 42.1   WBC 8.7   MCV 84.2         K 4.9      CO2 26   BUN 12   CREATININE 0.78   GLUCOSE 94       No results for input(s): COVID19 in the last 720 hours.       MIRIAM Cummings CNP   Electronically signed 3/10/2022 at 11:01 AM    Kendra Foster Ann-Marie Wilks MD   Physician   General Surgery   Consults       Signed   Date of Service:  2022  3:30 PM         Related encounter: Preop from 2022 in [facility] and [address] [place] [address] Road           Signed        Expand All Collapse All          Show:Clear all  [x]Manual[x]Template[]Copied    Added by:  Bridgette Watts MD      []Oliver for details      [facility]  Surgery Clinic  Consult Note                 NAME:  Salome Segura  MRN: [id number]   YOB: 2000   Date: 2022   Age: 19 y.o. Gender: male      Body mass index is 55.33 kg/m². Chief Complaint: Recurrent  cleft infections     History of Present Illness: This is a 49-year-old male with morbid obesity who has had history of  cleft infections. Patient states that initially he had this episode in 2017. After it spontaneously drained, he felt that the symptoms had resolved. However he has been having recurrent episodes of drainage from the area starting this year. Patient feels that the infection is occurring in the same location. He has had no history of trauma to the area. He denies any fevers or chills. He comes to us for definitive treatment. Current Outpatient Medications on File Prior to Encounter   Medication Sig Dispense Refill    doxycycline monohydrate (ADOXA) 100 MG tablet Take 1 tablet by mouth 2 times daily for 10 days 20 tablet 0    naproxen (NAPROSYN) 375 MG tablet Take 1 tablet by mouth 2 times daily as needed for Pain 20 tablet 0    clindamycin (CLINDAGEL) 1 % gel APPLY TOPICALLY 2 TIMES DAILY. 1 Tube 5    [DISCONTINUED] doxycycline (VIBRAMYCIN) 100 MG capsule Take 1 capsule by mouth 2 times daily 60 capsule 11    clotrimazole (LOTRIMIN) 1 % cream Apply topically 2 times daily. 1 Tube 0      No current facility-administered medications on file prior to encounter.          No Known Allergies     Past Medical History        Past Medical History:   Diagnosis Date    Obesity Past Surgical History   History reviewed. No pertinent surgical history. Family History         Family History   Problem Relation Age of Onset     () Unknown              Social History   Social History            Socioeconomic History    Marital status: Single       Spouse name: None    Number of children: None    Years of education: None    Highest education level: None   Occupational History    None   Tobacco Use    Smoking status: Never Smoker    Smokeless tobacco: Never Used   Substance and Sexual Activity    Alcohol use: None    Drug use: No    Sexual activity: None   Other Topics Concern    None   Social History Narrative    None      Social Determinants of Health          Financial Resource Strain:     Difficulty of Paying Living Expenses: Not on file   Food Insecurity:     Worried About Running Out of Food in the Last Year: Not on file    Kirk of Food in the Last Year: Not on file   Transportation Needs:     Lack of Transportation (Medical): Not on file    Lack of Transportation (Non-Medical):  Not on file   Physical Activity:     Days of Exercise per Week: Not on file    Minutes of Exercise per Session: Not on file   Stress:     Feeling of Stress : Not on file   Social Connections:     Frequency of Communication with Friends and Family: Not on file    Frequency of Social Gatherings with Friends and Family: Not on file    Attends Muslim Services: Not on file    Active Member of [address] [place] [address] or Organizations: Not on file    Attends Club or Organization Meetings: Not on file    Marital Status: Not on file   Intimate Partner Violence:     Fear of Current or Ex-Partner: Not on file    Emotionally Abused: Not on file    Physically Abused: Not on file    Sexually Abused: Not on file   Housing Stability:     Unable to Pay for Housing in the Last Year: Not on file    Number of [place] in the Last Year: Not on file    Unstable Housing in the Last Year: Not on file Review of Systems - Negative except depression         Vitals:     22 1557   BP: (!) 137/94   Pulse: 90         No intake/output data recorded. General Appearance: alert and oriented to person, place and time, well developed and well- nourished, in no acute distress patient is morbidly obese  Skin: warm and dry, no rash or erythema  Head: normocephalic and atraumatic  Eyes: pupils equal, round, and reactive to light, extraocular eye movements intact, conjunctivae normal  ENT: tympanic membrane, external ear and ear canal normal bilaterally, nose without deformity, nasal mucosa and turbinates normal without polyps  Neck: supple and non-tender without mass, no thyromegaly or thyroid nodules, no cervical lymphadenopathy  Pulmonary/Chest: clear to auscultation bilaterally- no wheezes, rales or rhonchi, normal air movement, no respiratory distress  Cardiovascular: normal rate, regular rhythm, normal S1 and S2, no murmurs, rubs, clicks, or gallops, distal pulses intact, no carotid bruits  Abdomen: soft, non-tender, non-distended, normal bowel sounds, no masses or organomegaly. Examination in the prone jackknife position shows an area of induration in the flor cleft region with small dimpling in the midline suggesting possibly pilonidal cyst related infection. There is no underlying abscess noted.   Extremities: no cyanosis, clubbing or edema  Musculoskeletal: normal range of motion, no joint swelling, deformity or tenderness  Neurologic: reflexes normal and symmetric, no cranial nerve deficit, gait, coordination and speech normal           Hemoglobin   Date Value Ref Range Status   2016 14.7 13.5 - 17.5 g/dL Final            Hematocrit   Date Value Ref Range Status   2016 44.0 41 - 53 % Final            WBC   Date Value Ref Range Status   2016 8.7 4.5 - 13.5 k/uL Final            Sodium   Date Value Ref Range Status   2016 139 135 - 144 mmol/L Final            Potassium   Date Value Ref Range Status   2016 4.0 3.6 - 4.9 mmol/L Final            Chloride   Date Value Ref Range Status   2016 100 98 - 107 mmol/L Final            CO2   Date Value Ref Range Status   2016 22 20 - 31 mmol/L Final            BUN   Date Value Ref Range Status   2016 15 5 - 18 mg/dL Final            Glucose   Date Value Ref Range Status   2016 89 60 - 100 mg/dL Final         No results found. Assessment: Pilonidal cyst     Plan: Patient with recurrent episodes of flor cleft infections. The findings are most likely suggestive of pilonidal cyst disease. I recommend patient to consider excision with cystectomy. Procedure and the risks were discussed. Risks for infection, bleeding, recurrence, slow healing of the wound etc. were discussed. Strong recommendation was made for the patient to make effort to lose weight. We will schedule for the surgery in the near future.      The total time spent on review of patient's record, physical examination, treatment plan and review of systems included: 30     Electronically signed by Deann Salas MD on 2022 at 5:11 PM

## 2022-03-10 NOTE — OP NOTE
Operative Note      Patient: Salome Segura  YOB: 2000  MRN: [id number]    Date of Procedure: 3/10/2022    Pre-Op Diagnosis: DX PILONIDAL CYST  ([facility])    Post-Op Diagnosis: Same       Procedure(s):  PILONIDAL CYSTECTOMY    Surgeon(s): Merlyn Worthington MD    Assistant:   Resident: Marko Saucedo DO    Anesthesia: General    Estimated Blood Loss (mL): 25    Complications: None    Specimens:   ID Type Source Tests Collected by Time Destination   A : Pilonidal Cyst Tissue Cyst SURGICAL PATHOLOGY Merlyn Worthington MD 3/10/2022 1155        Implants:  * No implants in log *      Drains:   [REMOVED] NG/OG/NJ/NE Tube Orogastric 18 fr Center mouth (Removed)       Findings: Pilonidal cyst    Detailed Description of Procedure:   Patient was brought to the operating room and was placed in the supine position. After induction of general endotracheal anesthesia, patient was placed in the prone jackknife position buttocks were taped apart. The flor cleft and perineal area was shaved and then prepped and draped in usual sterile fashion. Elliptical incision site was marked with a marking pen. 15 blade was used to make the incision. Incision was taken down deep to the subcutaneous tissue where the pilonidal cyst disease was excised. After excision and after mobilizing the flaps, the area was injected with half percent Marcaine with epinephrine. The tapes were released. 2-0 Vicryl was next used in interrupted fashion to close the deeper subcutaneous tissue. Second layer of 2-0 Vicryl was also used to approximate subcutaneous tissue. 3-0 Vicryl was used to approximate the superficial subcutaneous tissue. 3-0 nylon was used to approximate skin. A pressure dressing was applied at the site. Patient was brought to recovery room in stable condition.     Electronically signed by Merlyn Worthington MD on 3/10/2022 at 12:42 PM

## 2022-03-10 NOTE — ANESTHESIA PRE PROCEDURE
Department of Anesthesiology  Preprocedure Note       Name:  Meeta Leal   Age:  19 y.o.  :  2000                                          MRN:  [id number]         Date:  3/10/2022      Surgeon: Kaela Serra): Manan Deluna MD    Procedure: Procedure(s):  PILONIDAL CYSTECTOMY    Medications prior to admission:   Prior to Admission medications    Medication Sig Start Date End Date Taking? Authorizing Provider   acetaminophen (TYLENOL) 325 MG tablet Take 1,000 mg by mouth every 6 hours as needed for Pain    Historical Provider, MD   doxycycline (VIBRAMYCIN) 100 MG capsule Take 1 capsule by mouth 2 times daily 16  Mabel Segura MD       Current medications:    No current facility-administered medications for this encounter. Allergies:  No Known Allergies    Problem List:    Patient Active Problem List   Diagnosis Code    Morbid obesity with BMI of 50.0-59.9, [facility] E66.01, Z68.43    Hidradenitis suppurativa L73.2       Past Medical History:        Diagnosis Date    Obesity        Past Surgical History:        Procedure Laterality Date    EYE SURGERY      eyebrow closure       Social History:    Social History     Tobacco Use    Smoking status: Former Smoker     Quit date: 2022     Years since quittin.0    Smokeless tobacco: Never Used   Substance Use Topics    Alcohol use: Yes     Alcohol/week: 0.0 standard drinks     Comment: rarely                                Counseling given: Not Answered      Vital Signs (Current): There were no vitals filed for this visit.                                            BP Readings from Last 3 Encounters:   22 (!) 143/87   22 (!) 137/94   22 (!) 175/89       NPO Status:                                                                                 BMI:   Wt Readings from Last 3 Encounters:   22 (!) 412 lb (186.9 kg)   22 (!) 408 lb (185.1 kg)   22 (!) 410 lb (186 kg)     There is no height or weight on file to calculate BMI.    CBC:   Lab Results   Component Value Date    WBC 8.7 02/25/2022    RBC 5.00 02/25/2022    HGB 13.5 02/25/2022    HCT 42.1 02/25/2022    MCV 84.2 02/25/2022    RDW 14.9 02/25/2022     02/25/2022       CMP:   Lab Results   Component Value Date     02/25/2022    K 4.9 02/25/2022     02/25/2022    CO2 26 02/25/2022    BUN 12 02/25/2022    CREATININE 0.78 02/25/2022    GFRAA >60 02/25/2022    LABGLOM >60 02/25/2022    GLUCOSE 94 02/25/2022    CALCIUM 9.4 02/25/2022       POC Tests: No results for input(s): POCGLU, POCNA, POCK, POCCL, POCBUN, POCHEMO, POCHCT in the last 72 hours. Coags: No results found for: PROTIME, INR, APTT    HCG (If Applicable): No results found for: PREGTESTUR, PREGSERUM, HCG, HCGQUANT     ABGs: No results found for: PHART, PO2ART, UFQ9QYU, SMB0INK, BEART, P2FALDPJ     Type & Screen (If Applicable):  No results found for: LABABO, LABRH    Drug/Infectious Status (If Applicable):  No results found for: HIV, HEPCAB    COVID-19 Screening (If Applicable): No results found for: COVID19        Anesthesia Evaluation   no history of anesthetic complications:   Airway: Mallampati: III  TM distance: >3 FB   Neck ROM: full  Mouth opening: > = 3 FB Dental:          Pulmonary:normal exam        (-) not a current smoker (former)                           Cardiovascular:  Exercise tolerance: good (>4 METS),       (-)  angina                Neuro/Psych:   Negative Neuro/Psych ROS              GI/Hepatic/Renal:   (+) morbid obesity          Endo/Other: Negative Endo/Other ROS                    Abdominal:             Vascular: Other Findings:           Anesthesia Plan      general     ASA 3     (Ett)  Induction: intravenous. MIPS: Postoperative opioids intended and Prophylactic antiemetics administered. Anesthetic plan and risks discussed with patient. Plan discussed with CRNA.     Attending anesthesiologist reviewed and agrees with Preprocedure content            Robert Park MD   3/10/2022

## 2022-03-10 NOTE — ANESTHESIA POSTPROCEDURE EVALUATION
Department of Anesthesiology  Postprocedure Note    Patient: Jack Villarreal  MRN: [id number]  YOB: 2000  Date of evaluation: 3/10/2022  Time:  2:25 PM     Procedure Summary     Date: 03/10/22 Room / Location: [address] [place] [address] / [address] [place] [address]    Anesthesia Start: 3221 Anesthesia Stop: 1301    Procedure: PILONIDAL CYSTECTOMY (N/A Sacrum) Diagnosis: (DX PILONIDAL CYST  ([facility]))    Surgeons: Alex Castillo MD Responsible Provider: Walter Beckham MD    Anesthesia Type: general ASA Status: 3          Anesthesia Type: general    Radha Phase I: Radha Score: 8    Radha Phase II:      Last vitals: Reviewed and per EMR flowsheets.        Anesthesia Post Evaluation    Patient location during evaluation: PACU  Patient participation: complete - patient participated  Level of consciousness: awake  Airway patency: patent  Nausea & Vomiting: no nausea  Complications: no  Cardiovascular status: blood pressure returned to baseline  Respiratory status: acceptable  Hydration status: euvolemic  Comments: Multimodal analgesia pain management as indicated by procedure  Multimodal analgesia pain management approach

## 2022-03-11 LAB — SURGICAL PATHOLOGY REPORT: NORMAL

## 2022-03-21 ENCOUNTER — HOSPITAL ENCOUNTER (OUTPATIENT)
Dept: SURGERY | Age: 22
Discharge: HOME | End: 2022-03-21

## 2022-03-21 PROCEDURE — 99211 OFF/OP EST MAY X REQ PHY/QHP: CPT

## 2022-03-21 PROCEDURE — 99024 POSTOP FOLLOW-UP VISIT: CPT | Performed by: SURGERY

## 2022-03-21 NOTE — LETTER
[facility] [address] [place] [address] Road  [address] [place] [address]  Hubert Presume  [address] [facility] [address]  Phone: [phone]    Kiko Armijo MD        April 18, 2022     Patient: Annia Cohen   YOB: 2000   Date of Visit: 3/21/2022       To Whom It May Concern: It is my medical opinion that Fiorella Harvey may return to work 04/04/2022 with no restrictions. If you have any questions or concerns, please don't hesitate to call.     Sincerely,        Kiko Armijo MD

## 2022-03-21 NOTE — PROGRESS NOTES
[facility] General Surgery Clinic  Progress Note        NAME:  Ailin Krishnamurthy  MRN: [id number]   YOB: 2000   Date: 3/21/2022   Age: 19 y.o. Gender: male     There is no height or weight on file to calculate BMI. Chief Complaint: S/P pilonidal cystectomy    History of Present Illness:  Mr. Jeremi Palmer was seen in the office today following recent pilonidal cystectomy. His incision appears to be healing well at this point without signs of erythema or infection. Pathology was consistent with a chronic pilonidal cyst with fibrosis, scarring, and chronic hemorrhage. He has not had any unusual drainage. Current Outpatient Medications on File Prior to Encounter   Medication Sig Dispense Refill    ibuprofen (ADVIL) 200 MG tablet Take 2 tablets by mouth every 6 hours 120 tablet 3    sennosides-docusate sodium (SENOKOT-S) 8.6-50 MG tablet Take 1 tablet by mouth daily Take if taking narcotic pain medication to avoid constipation 60 tablet 0    ondansetron (ZOFRAN) 4 MG tablet Take 1 tablet by mouth every 8 hours as needed for Nausea or Vomiting 20 tablet 0    acetaminophen (TYLENOL) 325 MG tablet Take 1,000 mg by mouth every 6 hours as needed for Pain      [DISCONTINUED] doxycycline (VIBRAMYCIN) 100 MG capsule Take 1 capsule by mouth 2 times daily 60 capsule 11     No current facility-administered medications on file prior to encounter. No Known Allergies    Past Medical History:   Diagnosis Date    Obesity        Past Surgical History:   Procedure Laterality Date    EYE SURGERY      eyebrow closure    PILONIDAL CYST EXCISION N/A 3/10/2022    PILONIDAL CYSTECTOMY performed by Nancy Lamb MD at [address] [place] [address]       History reviewed. No pertinent family history.      Social History     Socioeconomic History    Marital status: Single     Spouse name: None    Number of children: None    Years of education: None    Highest education level: None   Occupational History    None   Tobacco Use    Smoking status: Former Smoker     Quit date: 2022     Years since quittin.1    Smokeless tobacco: Never Used    Tobacco comment: smokes rare cigar   Vaping Use    Vaping Use: Never used   Substance and Sexual Activity    Alcohol use: Yes     Alcohol/week: 0.0 standard drinks     Comment: rarely    Drug use: Not Currently    Sexual activity: None   Other Topics Concern    None   Social History Narrative    None     Social Determinants of Health     Financial Resource Strain:     Difficulty of Paying Living Expenses: Not on file   Food Insecurity:     Worried About Running Out of Food in the Last Year: Not on file    Kirk of Food in the Last Year: Not on file   Transportation Needs:     Lack of Transportation (Medical): Not on file    Lack of Transportation (Non-Medical): Not on file   Physical Activity:     Days of Exercise per Week: Not on file    Minutes of Exercise per Session: Not on file   Stress:     Feeling of Stress : Not on file   Social Connections:     Frequency of Communication with Friends and Family: Not on file    Frequency of Social Gatherings with Friends and Family: Not on file    Attends Gnosticism Services: Not on file    Active Member of [address] [place] [address] or Organizations: Not on file    Attends Club or Organization Meetings: Not on file    Marital Status: Not on file   Intimate Partner Violence:     Fear of Current or Ex-Partner: Not on file    Emotionally Abused: Not on file    Physically Abused: Not on file    Sexually Abused: Not on file   Housing Stability:     Unable to Pay for Housing in the Last Year: Not on file    Number of [place] in the Last Year: Not on file    Unstable Housing in the Last Year: Not on file       Review of Systems - History obtained from chart review and the patient    There were no vitals filed for this visit. No intake/output data recorded.     Intergluteal wound healing well at this point without signs of erythema or infection, sutures removed in the office today without incident    Hemoglobin   Date Value Ref Range Status   02/25/2022 13.5 13.0 - 17.0 g/dL Final     Hematocrit   Date Value Ref Range Status   02/25/2022 42.1 40.7 - 50.3 % Final     WBC   Date Value Ref Range Status   02/25/2022 8.7 3.5 - 11.3 k/uL Final     Sodium   Date Value Ref Range Status   02/25/2022 138 135 - 144 mmol/L Final     Potassium   Date Value Ref Range Status   02/25/2022 4.9 3.7 - 5.3 mmol/L Final     Chloride   Date Value Ref Range Status   02/25/2022 102 98 - 107 mmol/L Final     CO2   Date Value Ref Range Status   02/25/2022 26 20 - 31 mmol/L Final     BUN   Date Value Ref Range Status   02/25/2022 12 6 - 20 mg/dL Final     Glucose   Date Value Ref Range Status   02/25/2022 94 70 - 99 mg/dL Final            Assessment: Pilonidal cyst    Plan: At this point, the patient appears to be healing well following recent pilonidal cystectomy. I discussed with the patient my recommendations for continuing with his activity restrictions for another week or 2. I have recommended follow-up with us in the office on an as-needed basis, should any further questions or concerns arise. The patient was provided the office number for future contact.     Electronically signed by Jos Sousa MD on 3/21/2022 at 6:30 PM

## 2024-03-16 ENCOUNTER — HOSPITAL ENCOUNTER (EMERGENCY)
Age: 24
Discharge: HOME | End: 2024-03-16
Attending: STUDENT IN AN ORGANIZED HEALTH CARE EDUCATION/TRAINING PROGRAM
Payer: COMMERCIAL

## 2024-03-16 ENCOUNTER — APPOINTMENT (OUTPATIENT)
Dept: CT IMAGING | Age: 24
End: 2024-03-16
Payer: COMMERCIAL

## 2024-03-16 ENCOUNTER — APPOINTMENT (OUTPATIENT)
Dept: GENERAL RADIOLOGY | Age: 24
End: 2024-03-16
Payer: COMMERCIAL

## 2024-03-16 VITALS
HEIGHT: 72 IN | DIASTOLIC BLOOD PRESSURE: 91 MMHG | HEART RATE: 105 BPM | RESPIRATION RATE: 18 BRPM | OXYGEN SATURATION: 96 % | BODY MASS INDEX: 42.66 KG/M2 | WEIGHT: 315 LBS | SYSTOLIC BLOOD PRESSURE: 149 MMHG | TEMPERATURE: 98.6 F

## 2024-03-16 DIAGNOSIS — S22.000A CLOSED COMPRESSION FRACTURE OF THORACIC VERTEBRA, INITIAL ENCOUNTER (HCC): Primary | ICD-10-CM

## 2024-03-16 PROCEDURE — 72128 CT CHEST SPINE W/O DYE: CPT

## 2024-03-16 PROCEDURE — 99284 EMERGENCY DEPT VISIT MOD MDM: CPT

## 2024-03-16 PROCEDURE — 72100 X-RAY EXAM L-S SPINE 2/3 VWS: CPT

## 2024-03-16 PROCEDURE — 6370000000 HC RX 637 (ALT 250 FOR IP): Performed by: STUDENT IN AN ORGANIZED HEALTH CARE EDUCATION/TRAINING PROGRAM

## 2024-03-16 RX ORDER — ACETAMINOPHEN 500 MG
1000 TABLET ORAL ONCE
Status: COMPLETED | OUTPATIENT
Start: 2024-03-16 | End: 2024-03-16

## 2024-03-16 RX ORDER — LIDOCAINE 4 G/G
4 PATCH TOPICAL DAILY
Status: DISCONTINUED | OUTPATIENT
Start: 2024-03-16 | End: 2024-03-16 | Stop reason: HOSPADM

## 2024-03-16 RX ORDER — IBUPROFEN 800 MG/1
800 TABLET ORAL ONCE
Status: COMPLETED | OUTPATIENT
Start: 2024-03-16 | End: 2024-03-16

## 2024-03-16 RX ORDER — LIDOCAINE 50 MG/G
1 PATCH TOPICAL DAILY
Qty: 10 PATCH | Refills: 0 | Status: SHIPPED | OUTPATIENT
Start: 2024-03-16 | End: 2024-03-26

## 2024-03-16 RX ORDER — LIDOCAINE 4 G/G
4 PATCH TOPICAL DAILY
Status: DISCONTINUED | OUTPATIENT
Start: 2024-03-16 | End: 2024-03-16

## 2024-03-16 RX ORDER — KETOROLAC TROMETHAMINE 10 MG/1
10 TABLET, FILM COATED ORAL EVERY 6 HOURS PRN
Qty: 20 TABLET | Refills: 0 | Status: SHIPPED | OUTPATIENT
Start: 2024-03-16

## 2024-03-16 RX ADMIN — IBUPROFEN 800 MG: 800 TABLET ORAL at 05:16

## 2024-03-16 RX ADMIN — ACETAMINOPHEN 1000 MG: 500 TABLET ORAL at 05:17

## 2024-03-16 ASSESSMENT — PAIN SCALES - GENERAL
PAINLEVEL_OUTOF10: 6
PAINLEVEL_OUTOF10: 3
PAINLEVEL_OUTOF10: 6

## 2024-03-16 ASSESSMENT — PAIN - FUNCTIONAL ASSESSMENT: PAIN_FUNCTIONAL_ASSESSMENT: 0-10

## 2024-03-16 NOTE — ED PROVIDER NOTES
[facility] EMERGENCY DEPARTMENT  Emergency Department Encounter  [facility]       Pt Name:Ly Mullins  MRN: [id number]  Birthdate 2000  Date of evaluation: 3/16/24  PCP:  No, Pcp      CHIEF COMPLAINT       Chief Complaint   Patient presents with    Back Pain     Pt c/o lower thoracic and lumbar back pain. Pt stated he was at work, [employer], pt fell and pt was trying to get a sunday pad under neither a patient and patient was struggling, so patient and another nurse, had to lift patient up from the ground, due to patient combative.        HISTORY OF PRESENT ILLNESS     Ly Mullins is a 23 y.o. male who presents via Personal vehicle with reports of back pain.  Patient reports acute onset of back pain when attempting to lift a patient while at work.  Patient works at nursing facility.  Attempted to lift a patient.  Had acute onset of back pain.  He reports a grabbing type sensation.  Worse with movement.  No loss of control of bowel or bladder.  No numbness.  No tingling.  He is localizing the pain to the mid back and low back.  He denies falls.  He denies incontinence.  He has been able to empty his bladder.  He had no urinary retention.  No penile or rectal numbness or tingling.    PAST MEDICAL / SURGICAL / SOCIAL / FAMILY HISTORY      has a past medical history of Obesity.       has a past surgical history that includes eye surgery and Pilonidal cyst excision (N/A, 3/10/2022).      Social History     Socioeconomic History    Marital status:      Spouse name: Not on file    Number of children: Not on file    Years of education: Not on file    Highest education level: Not on file   Occupational History    Not on file   Tobacco Use    Smoking status: Former     Current packs/day: 0.00     Types: Cigarettes     Quit date: 2022     Years since quittin.1    Smokeless tobacco: Never    Tobacco comments:     smokes rare cigar   Vaping Use     Vaping Use: Never used   Substance and Sexual Activity    Alcohol use: Yes     Alcohol/week: 0.0 standard drinks of alcohol     Comment: rarely    Drug use: Not Currently    Sexual activity: Not on file   Other Topics Concern    Not on file   Social History Narrative    Not on file     Social Determinants of Health     Financial Resource Strain: Not on file   Food Insecurity: Not on file   Transportation Needs: Not on file   Physical Activity: Not on file   Stress: Not on file   Social Connections: Not on file   Intimate Partner Violence: Not on file   Housing Stability: Not on file       No family history on file.    Allergies:  Patient has no known allergies.    Home Medications:  Prior to Admission medications    Medication Sig Start Date End Date Taking? Authorizing Provider   ketorolac (TORADOL) 10 MG tablet Take 1 tablet by mouth every 6 hours as needed for Pain 3/16/24  Yes Jovanny Horan DO   lidocaine (LIDODERM) 5 % Place 1 patch onto the skin daily for 10 days 12 hours on, 12 hours off. 3/16/24 3/26/24 Yes Jovanny Horan DO   ibuprofen (ADVIL) 200 MG tablet Take 2 tablets by mouth every 6 hours 3/10/22   Clalie Castillo, DO   sennosides-docusate sodium (SENOKOT-S) 8.6-50 MG tablet Take 1 tablet by mouth daily Take if taking narcotic pain medication to avoid constipation 3/10/22   Callie Castillo, DO   ondansetron (ZOFRAN) 4 MG tablet Take 1 tablet by mouth every 8 hours as needed for Nausea or Vomiting 3/10/22   Callie Castillo, DO   acetaminophen (TYLENOL) 325 MG tablet Take 1,000 mg by mouth every 6 hours as needed for Pain    Provider, MD Hodan   doxycycline (VIBRAMYCIN) 100 MG capsule Take 1 capsule by mouth 2 times daily 1/26/16 1/30/22  Mynor Salas MD         REVIEW OF SYSTEMS        Pertinent systems reviewed and negative with exception of those noted in HPI.     PHYSICAL EXAM      INITIAL VITALS:   BP (!) 149/91   Pulse (!) 105   Temp 98.6 °F (37 °C)   Resp 18   Ht 1.829 m (6')   Wt (!)

## 2024-03-16 NOTE — ED PROVIDER NOTES
[facility] Emergency Department  [address]  [place] [address]  Phone: [phone]  Fax: [phone]        ADDENDUM:      Care of this patient was assumed from Dr. Desai.  The patient was seen for Back Pain (Pt c/o lower thoracic and lumbar back pain. Pt stated he was at work, [employer], pt fell and pt was trying to get a sunday pad under neither a patient and patient was struggling, so patient and another nurse, had to lift patient up from the ground, due to patient combative. )  .  The patient's initial evaluation and plan have been discussed with the prior provider who initially evaluated the patient.  Nursing Notes, Past Medical Hx, Past Surgical Hx, Allergies, were all reviewed.    PAST MEDICAL HISTORY    has a past medical history of Obesity.    SURGICAL HISTORY      has a past surgical history that includes eye surgery and Pilonidal cyst excision (N/A, 3/10/2022).    CURRENT MEDICATIONS       Previous Medications    ACETAMINOPHEN (TYLENOL) 325 MG TABLET    Take 1,000 mg by mouth every 6 hours as needed for Pain    IBUPROFEN (ADVIL) 200 MG TABLET    Take 2 tablets by mouth every 6 hours    ONDANSETRON (ZOFRAN) 4 MG TABLET    Take 1 tablet by mouth every 8 hours as needed for Nausea or Vomiting    SENNOSIDES-DOCUSATE SODIUM (SENOKOT-S) 8.6-50 MG TABLET    Take 1 tablet by mouth daily Take if taking narcotic pain medication to avoid constipation       ALLERGIES     has No Known Allergies.      Diagnostic Results       LABS:   No results found for this visit on 03/16/24.    RADIOLOGY:  CT THORACIC SPINE WO CONTRAST   Preliminary Result   1. Anterior wedge compression deformities at T10, T11 and T12 with associated   mild-to-moderate degenerative changes and endplate Schmorl's node   deformities.  Findings favored to be chronic.  If there is point tenderness   at this level, consider further evaluation with MRI.   2. No acute vertebral body height loss evident in

## 2025-05-24 ENCOUNTER — HOSPITAL ENCOUNTER (OUTPATIENT)
Age: 25
Discharge: HOME | End: 2025-05-24
Payer: COMMERCIAL

## 2025-05-24 LAB
ESTRADIOL LEVEL: 40.1 PG/ML (ref 27–52)
FSH SERPL-ACNC: 7 MIU/ML (ref 1.5–12.4)
PROLACTIN SERPL-MCNC: 18.7 NG/ML (ref 4.04–15.2)
TESTOST SERPL-MCNC: 75 NG/DL (ref 249–836)

## 2025-05-24 PROCEDURE — 84146 ASSAY OF PROLACTIN: CPT

## 2025-05-24 PROCEDURE — 84403 ASSAY OF TOTAL TESTOSTERONE: CPT

## 2025-05-24 PROCEDURE — 82670 ASSAY OF TOTAL ESTRADIOL: CPT

## 2025-05-24 PROCEDURE — 83001 ASSAY OF GONADOTROPIN (FSH): CPT

## 2025-08-12 ENCOUNTER — HOSPITAL ENCOUNTER (OUTPATIENT)
Dept: LAB | Age: 25
Discharge: HOME | End: 2025-08-12
Payer: COMMERCIAL

## 2025-08-12 LAB
LH SERPL-ACNC: 6.4 MIU/ML (ref 1.7–8.6)
MISCELLANEOUS LAB TEST RESULT: NORMAL
SHBG SERPL-SCNC: 11 NMOL/L (ref 10–60)
TEST NAME: NORMAL
TESTOST FREE MFR SERPL: 19 PG/ML (ref 47–244)
TESTOST SERPL-MCNC: 64 NG/DL (ref 249–836)

## 2025-08-12 PROCEDURE — 81403 MOPATH PROCEDURE LEVEL 4: CPT

## 2025-08-12 PROCEDURE — 84270 ASSAY OF SEX HORMONE GLOBUL: CPT

## 2025-08-12 PROCEDURE — 88280 CHROMOSOME KARYOTYPE STUDY: CPT

## 2025-08-12 PROCEDURE — 81220 CFTR GENE COM VARIANTS: CPT

## 2025-08-12 PROCEDURE — 88230 TISSUE CULTURE LYMPHOCYTE: CPT

## 2025-08-12 PROCEDURE — 36415 COLL VENOUS BLD VENIPUNCTURE: CPT

## 2025-08-12 PROCEDURE — 83002 ASSAY OF GONADOTROPIN (LH): CPT

## 2025-08-12 PROCEDURE — 84403 ASSAY OF TOTAL TESTOSTERONE: CPT

## 2025-08-12 PROCEDURE — 88262 CHROMOSOME ANALYSIS 15-20: CPT

## 2025-08-20 LAB — CHROMOSOME STUDY: NORMAL

## 2025-08-21 LAB
MISCELLANEOUS LAB TEST RESULT: NORMAL
TEST NAME: NORMAL

## 2025-08-24 LAB
CFTR ALLELE 1 BLD/T QL: NEGATIVE
CFTR ALLELE 2 BLD/T QL: NEGATIVE
CFTR MUT ANL BLD/T: NORMAL
CFTR MUT ANL BLD/T: NORMAL

## (undated) DEVICE — INSERT CUSHION HEAD PRONEVIEW

## (undated) DEVICE — GLOVE SURG SZ 8 CRM LTX FREE POLYISOPRENE POLYMER BEAD ANTI

## (undated) DEVICE — GAUZE,SPONGE,FLUFF,6"X6.75",STRL,5/TRAY: Brand: MEDLINE

## (undated) DEVICE — BANDAGE,GAUZE,BULKEE II,4.5"X4.1YD,STRL: Brand: MEDLINE

## (undated) DEVICE — HYPODERMIC SAFETY NEEDLE: Brand: MAGELLAN

## (undated) DEVICE — SKIN PREP TRAY W/CHG: Brand: MEDLINE INDUSTRIES, INC.

## (undated) DEVICE — INTENDED FOR TISSUE SEPARATION, AND OTHER PROCEDURES THAT REQUIRE A SHARP SURGICAL BLADE TO PUNCTURE OR CUT.: Brand: BARD-PARKER ® CARBON RIB-BACK BLADES

## (undated) DEVICE — SURGICAL PROCEDURE KIT BASIN MAJ SET UP

## (undated) DEVICE — SYRINGE, LUER LOCK, 10ML: Brand: MEDLINE

## (undated) DEVICE — YANKAUER,BULB TIP,W/O VENT,RIGID,STERILE: Brand: MEDLINE

## (undated) DEVICE — GLOVE SURG SZ 75 CRM LTX FREE POLYISOPRENE POLYMER BEAD ANTI